# Patient Record
Sex: MALE | Race: WHITE | NOT HISPANIC OR LATINO | Employment: FULL TIME | URBAN - METROPOLITAN AREA
[De-identification: names, ages, dates, MRNs, and addresses within clinical notes are randomized per-mention and may not be internally consistent; named-entity substitution may affect disease eponyms.]

---

## 2022-08-18 ENCOUNTER — OFFICE VISIT (OUTPATIENT)
Dept: URGENT CARE | Facility: URGENT CARE | Age: 62
End: 2022-08-18
Payer: COMMERCIAL

## 2022-08-18 VITALS
OXYGEN SATURATION: 97 % | HEART RATE: 92 BPM | WEIGHT: 175.6 LBS | TEMPERATURE: 99.4 F | SYSTOLIC BLOOD PRESSURE: 175 MMHG | RESPIRATION RATE: 20 BRPM | DIASTOLIC BLOOD PRESSURE: 92 MMHG

## 2022-08-18 DIAGNOSIS — M10.071 ACUTE IDIOPATHIC GOUT INVOLVING TOE OF RIGHT FOOT: Primary | ICD-10-CM

## 2022-08-18 PROCEDURE — 99204 OFFICE O/P NEW MOD 45 MIN: CPT | Performed by: STUDENT IN AN ORGANIZED HEALTH CARE EDUCATION/TRAINING PROGRAM

## 2022-08-18 RX ORDER — INDOMETHACIN 50 MG/1
50 CAPSULE ORAL
Qty: 21 CAPSULE | Refills: 0 | Status: SHIPPED | OUTPATIENT
Start: 2022-08-18 | End: 2022-08-25

## 2022-08-18 NOTE — LETTER
RETURN TO WORK/SCHOOL FORM    8/18/2022    Re: Gentry E Jonel  1960      To Whom It May Concern:     Gentry E Jonel was seen in clinic today. He may return to work on 8/19/22.         Andrea Caceres MD  8/18/2022 11:24 AM

## 2022-08-18 NOTE — PROGRESS NOTES
SUBJECTIVE:  Gentry Remy is an 61 year old male who presents for     Gout  - 30 years   - current flare up 5 days now  - didn't have health insurance last 10 years  - only taking ibuprofen 200mg 6-8 pills/day   - Right foot/ big toe  - hands hurting today  - indomethacin in past really worked well  - flares 2/year      PMH:   has a past medical history of Gout.  Patient Active Problem List   Diagnosis     CARDIOVASCULAR SCREENING; LDL GOAL LESS THAN 130     Tobacco abuse     Gout     Erectile dysfunction     Social History     Socioeconomic History     Marital status: Single     Spouse name: None     Number of children: 1     Years of education: None     Highest education level: None   Occupational History     Employer: [employer]   Tobacco Use     Smoking status: Current Every Day Smoker     Packs/day: 1.00     Types: Cigarettes     Smokeless tobacco: Never Used   Substance and Sexual Activity     Alcohol use: Yes     Comment: 5-6 beers/week     Drug use: No     Sexual activity: Not Currently     Partners: Female     Family History   Problem Relation Age of Onset     C.A.D. Father         age 75     Heart Disease Father      Arthritis Mother      Unknown/Adopted Maternal Grandmother      Unknown/Adopted Maternal Grandfather      Unknown/Adopted Paternal Grandmother      Unknown/Adopted Paternal Grandfather        ALLERGIES:  Pollen extract and Seasonal allergies    Current Outpatient Medications   Medication     IBUPROFEN PO     ALEVE 220 MG PO CAPS     indomethacin (INDOCIN) 50 MG capsule     sildenafil (VIAGRA) 100 MG tablet     No current facility-administered medications for this visit.         ROS:  ROS is done and is negative for general/constitutional, eye, ENT, Respiratory, cardiovascular, GI, , Skin, musculoskeletal except as noted elsewhere.  All other review of systems negative except as noted elsewhere.    OBJECTIVE:  BP (!) 175/92 (BP Location: Left arm, Patient Position: Sitting,  Cuff Size: Adult Regular)   Pulse 112   Temp 99.4  F (37.4  C) (Tympanic)   Resp 20   Wt 79.7 kg (175 lb 9.6 oz)   SpO2 97%   GENERAL APPEARANCE: Alert, in no acute distress.  EYES: Conjunctivae clear.  RESP: Clear to auscultation bilaterally, no wheezing or retractions, no increased effort.  CV: Regular rate and rhythm, no murmurs, good capillary refill.  MUSCULOSKELETAL: right 1st MTP ttp  NEURO: No gross deficits, CN 2-12 grossly intact.    RESULTS  No results found for any visits on 08/18/22.  No results found for this or any previous visit (from the past 48 hour(s)).    ASSESSMENT/PLAN:    Acute Gout Flare  Of right first MTP.  Has been taking ibuprofen but has not really been working that well.  4 to 5 days now.  In the past he had been on allopurinol it sounds like, however he has not doctored for 10 years due to not having health insurance.  Usually has 2 flares per year.  Thinks flares are related to food consumption.  No significant alcohol use.  Indomethacin has worked well in the past and he would prefer to try this.  -Indomethacin 3 times daily for 7 days  -Follow-up with PCP in a couple weeks and establish care  -Guidance/precautions given    PPE worn: Yes    Options for treatment and follow-up care were reviewed with the patient and/or guardian. Gentry Remy and/or guardian engaged in the decision making process and verbalized understanding of the options discussed and agreed with the final plan.    See [facility] for orders, medications, letters, patient instructions    Yon Caceres DO, MBA